# Patient Record
(demographics unavailable — no encounter records)

---

## 2024-11-22 NOTE — HISTORY OF PRESENT ILLNESS
[Normal] : Normal [Brushing teeth twice/d] : brushing teeth twice per day [Playtime (60 min/d)] : playtime 60 min a day [< 2 hrs of screen time per day] : less than 2 hrs of screen time per day [Appropiate parent-child-sibling interaction] : appropriate parent-child-sibling interaction [Adequate social interactions] : adequate social interactions [Has Friends] : has friends [Adequate behavior] : adequate behavior [No difficulties with Homework] : no difficulties with homework [Parent discusses safety rules regarding adults] : parent discusses safety rules regarding adults [Up to date] : Up to date [de-identified] : loves to read, writes very well.  Does great at school [FreeTextEntry1] : 3rd grade,  after school she does art and science, film making, tennis  Tues and Thurs she does gymnastics at [facility] [employer].  Eating well.    eats fruits and vegetables, salads, She is the best eater of the 3 kids  sleeps well  brushes 2 x day. has dentist appt for beginning of December.  Going to [place] for Thanksgiving.

## 2024-11-22 NOTE — PHYSICAL EXAM
[Alert] : alert [No Acute Distress] : no acute distress [Normocephalic] : normocephalic [Conjunctivae with no discharge] : conjunctivae with no discharge [PERRL] : PERRL [EOMI Bilateral] : EOMI bilateral [Auricles Well Formed] : auricles well formed [Clear Tympanic membranes with present light reflex and bony landmarks] : clear tympanic membranes with present light reflex and bony landmarks [No Discharge] : no discharge [Nares Patent] : nares patent [Pink Nasal Mucosa] : pink nasal mucosa [Palate Intact] : palate intact [Nonerythematous Oropharynx] : nonerythematous oropharynx [Supple, full passive range of motion] : supple, full passive range of motion [No Palpable Masses] : no palpable masses [Symmetric Chest Rise] : symmetric chest rise [Clear to Auscultation Bilaterally] : clear to auscultation bilaterally [Regular Rate and Rhythm] : regular rate and rhythm [Normal S1, S2 present] : normal S1, S2 present [No Murmurs] : no murmurs [+2 Femoral Pulses] : +2 femoral pulses [Soft] : soft [NonTender] : non tender [Non Distended] : non distended [Normoactive Bowel Sounds] : normoactive bowel sounds [No Hepatomegaly] : no hepatomegaly [No Splenomegaly] : no splenomegaly [Maciel: ____] : Maciel [unfilled] [Maciel: _____] : Maciel [unfilled] [Patent] : patent [No fissures] : no fissures [No Abnormal Lymph Nodes Palpated] : no abnormal lymph nodes palpated [No Gait Asymmetry] : no gait asymmetry [No pain or deformities with palpation of bone, muscles, joints] : no pain or deformities with palpation of bone, muscles, joints [Normal Muscle Tone] : normal muscle tone [Straight] : straight [+2 Patella DTR] : +2 patella DTR [Cranial Nerves Grossly Intact] : cranial nerves grossly intact [No Rash or Lesions] : no rash or lesions

## 2024-12-11 NOTE — HISTORY OF PRESENT ILLNESS
[de-identified] : blood in urine [FreeTextEntry6] : After she urinates it burns. Mom noticed blood in the urine since Sunday.  Urine is stinging.  This is her first UTI.  Not constipated at all.

## 2024-12-11 NOTE — PLAN
[TextEntry] : U/A positive for blood and nitrites.   Urine culture sent.  Started on Keflex 250 mg/5ml  5ml TID x 10 days  She will go back to school now.  Encouraged to drink cranberry juice.